# Patient Record
Sex: MALE | Race: ASIAN | NOT HISPANIC OR LATINO | URBAN - METROPOLITAN AREA
[De-identification: names, ages, dates, MRNs, and addresses within clinical notes are randomized per-mention and may not be internally consistent; named-entity substitution may affect disease eponyms.]

---

## 2018-04-07 ENCOUNTER — EMERGENCY (EMERGENCY)
Facility: HOSPITAL | Age: 43
LOS: 1 days | Discharge: HOME | End: 2018-04-07
Attending: EMERGENCY MEDICINE
Payer: MEDICAID

## 2018-04-07 VITALS
TEMPERATURE: 98 F | SYSTOLIC BLOOD PRESSURE: 104 MMHG | HEART RATE: 61 BPM | DIASTOLIC BLOOD PRESSURE: 67 MMHG | OXYGEN SATURATION: 98 % | RESPIRATION RATE: 17 BRPM

## 2018-04-07 LAB
ALBUMIN SERPL ELPH-MCNC: 4.2 G/DL — SIGNIFICANT CHANGE UP (ref 3.3–5)
ALP SERPL-CCNC: 56 U/L — SIGNIFICANT CHANGE UP (ref 40–120)
ALT FLD-CCNC: 46 U/L RC — HIGH (ref 10–45)
ANION GAP SERPL CALC-SCNC: 13 MMOL/L — SIGNIFICANT CHANGE UP (ref 5–17)
AST SERPL-CCNC: 31 U/L — SIGNIFICANT CHANGE UP (ref 10–40)
BASOPHILS # BLD AUTO: 0 K/UL — SIGNIFICANT CHANGE UP (ref 0–0.2)
BASOPHILS NFR BLD AUTO: 0.5 % — SIGNIFICANT CHANGE UP (ref 0–2)
BILIRUB SERPL-MCNC: 0.5 MG/DL — SIGNIFICANT CHANGE UP (ref 0.2–1.2)
BUN SERPL-MCNC: 8 MG/DL — SIGNIFICANT CHANGE UP (ref 7–23)
CALCIUM SERPL-MCNC: 9.6 MG/DL — SIGNIFICANT CHANGE UP (ref 8.4–10.5)
CHLORIDE SERPL-SCNC: 103 MMOL/L — SIGNIFICANT CHANGE UP (ref 96–108)
CO2 SERPL-SCNC: 23 MMOL/L — SIGNIFICANT CHANGE UP (ref 22–31)
CREAT SERPL-MCNC: 1.04 MG/DL — SIGNIFICANT CHANGE UP (ref 0.5–1.3)
EOSINOPHIL # BLD AUTO: 0.1 K/UL — SIGNIFICANT CHANGE UP (ref 0–0.5)
EOSINOPHIL NFR BLD AUTO: 2.5 % — SIGNIFICANT CHANGE UP (ref 0–6)
GLUCOSE SERPL-MCNC: 119 MG/DL — HIGH (ref 70–99)
HCT VFR BLD CALC: 46.5 % — SIGNIFICANT CHANGE UP (ref 39–50)
HGB BLD-MCNC: 15.7 G/DL — SIGNIFICANT CHANGE UP (ref 13–17)
LYMPHOCYTES # BLD AUTO: 1.8 K/UL — SIGNIFICANT CHANGE UP (ref 1–3.3)
LYMPHOCYTES # BLD AUTO: 30.5 % — SIGNIFICANT CHANGE UP (ref 13–44)
MCHC RBC-ENTMCNC: 32 PG — SIGNIFICANT CHANGE UP (ref 27–34)
MCHC RBC-ENTMCNC: 33.9 GM/DL — SIGNIFICANT CHANGE UP (ref 32–36)
MCV RBC AUTO: 94.5 FL — SIGNIFICANT CHANGE UP (ref 80–100)
MONOCYTES # BLD AUTO: 0.6 K/UL — SIGNIFICANT CHANGE UP (ref 0–0.9)
MONOCYTES NFR BLD AUTO: 9.3 % — SIGNIFICANT CHANGE UP (ref 2–14)
NEUTROPHILS # BLD AUTO: 3.4 K/UL — SIGNIFICANT CHANGE UP (ref 1.8–7.4)
NEUTROPHILS NFR BLD AUTO: 57.2 % — SIGNIFICANT CHANGE UP (ref 43–77)
PLATELET # BLD AUTO: 162 K/UL — SIGNIFICANT CHANGE UP (ref 150–400)
POTASSIUM SERPL-MCNC: 3.9 MMOL/L — SIGNIFICANT CHANGE UP (ref 3.5–5.3)
POTASSIUM SERPL-SCNC: 3.9 MMOL/L — SIGNIFICANT CHANGE UP (ref 3.5–5.3)
PROT SERPL-MCNC: 7.5 G/DL — SIGNIFICANT CHANGE UP (ref 6–8.3)
RBC # BLD: 4.92 M/UL — SIGNIFICANT CHANGE UP (ref 4.2–5.8)
RBC # FLD: 12.5 % — SIGNIFICANT CHANGE UP (ref 10.3–14.5)
SODIUM SERPL-SCNC: 139 MMOL/L — SIGNIFICANT CHANGE UP (ref 135–145)
TROPONIN T SERPL-MCNC: <0.01 NG/ML — SIGNIFICANT CHANGE UP (ref 0–0.06)
WBC # BLD: 6 K/UL — SIGNIFICANT CHANGE UP (ref 3.8–10.5)
WBC # FLD AUTO: 6 K/UL — SIGNIFICANT CHANGE UP (ref 3.8–10.5)

## 2018-04-07 PROCEDURE — 93010 ELECTROCARDIOGRAM REPORT: CPT

## 2018-04-07 PROCEDURE — 71046 X-RAY EXAM CHEST 2 VIEWS: CPT | Mod: 26

## 2018-04-07 PROCEDURE — 99285 EMERGENCY DEPT VISIT HI MDM: CPT | Mod: 25

## 2018-04-07 RX ORDER — ASPIRIN/CALCIUM CARB/MAGNESIUM 324 MG
162 TABLET ORAL DAILY
Qty: 0 | Refills: 0 | Status: DISCONTINUED | OUTPATIENT
Start: 2018-04-07 | End: 2018-04-11

## 2018-04-07 RX ADMIN — Medication 162 MILLIGRAM(S): at 22:23

## 2018-04-07 NOTE — ED ADULT NURSE NOTE - OBJECTIVE STATEMENT
43 year old male presented to ED with c/o of palpitations starting today at 3PM. Pt denies CP, denies discomfort spreading anywhere. Pt denies SOB, nausea/vomiting, numbness/tingling, fever, cough, chills, dizziness. Pt has hx of one stent in heart. Pt a&ox3, lung sounds clear, heart rate regular, EKG completed handed to MD, pt placed on cardiac monitor. Abdomen soft nontender nondistended to palp. Skin intact. IV in left AC 20G and patent. Pt currently resting in bed with family at bedside. Side rails up for safety. Will continue to monitor and assess while offering support and reassurance.

## 2018-04-07 NOTE — ED ADULT NURSE NOTE - FALL HARM RISK TYPE OF ASSESSMENT
Pt remains on servoi vent with alarms on and functioning.Pt tolerated respiratory treatment with SHERWIN.   Daily Assessment

## 2018-04-07 NOTE — ED PROVIDER NOTE - NS_ ATTENDINGSCRIBEDETAILS _ED_A_ED_FT
The scribe's documentation has been prepared under my direction and personally reviewed by me in its entirety. I confirm that the note above accurately reflects all work, treatment, procedures, and medical decision making performed by me (Dr. Pretty).

## 2018-04-07 NOTE — ED PROVIDER NOTE - PROGRESS NOTE DETAILS
[employer]:  Pt still with no pain.  Feels comfortable.  Will observe in CDU for serial troponin and stress test in the morning.

## 2018-04-07 NOTE — ED PROVIDER NOTE - OBJECTIVE STATEMENT
44 yo m with pmhx of CAD and stents (placed 12/26/17) presents with right sided chest discomfort, now resolved. Pt states it first started 7 hours ago at 3 pm today when he was sleeping. Describes it as waxing and waning with no radiation. Pt hasn't taken any medication for it. Denies n/v, sob, sweating, abdominal pain, fever. Hasn't never done a stress test before.  PMD: Dr. Tika Thomas  Cardiologist: Dr. Espinoza 44 yo m with pmhx of CAD and stents (placed 12/26/17) presents with right sided chest discomfort, now resolved. Pt states it first started 7 hours ago at 3 pm today when he was sleeping. Describes it as waxing and waning with no radiation. Pt hasn't taken any medication for it. Denies n/v, sob, sweating, abdominal pain, fever. Has never done a stress test before.  PMD: Dr. Tika Thomas  Cardiologist: Dr. Espinoza 42 yo m with pmhx of CAD and stents (placed 12/26/17) presents with right sided chest discomfort, now resolved. Pt states it first started 7 hours ago at 3 pm today when he was sleeping. Describes it as waxing and waning with no radiation. Pt hasn't taken any medication for it. Denies n/v, sob, sweating, abdominal pain, fever. Has never had a stress test before.  PMD: Dr. Tika Thomas  Cardiologist: Dr. Espinoza

## 2018-04-07 NOTE — ED PROVIDER NOTE - MEDICAL DECISION MAKING DETAILS
44 yo m with hx of CAD presenting with episode of chest discomfort earlier today. pt is comfortable now with no pain or discomfort. will check cbc, cmp, troponin, chest x-ray. will reassess and consider observation for provacative testing.

## 2018-04-07 NOTE — ED ADULT NURSE NOTE - CHPI ED SYMPTOMS NEG
no chills/no back pain/no vomiting/no nausea/no chest pain/no cough/no shortness of breath/no syncope/no fever/no dizziness/no diaphoresis

## 2018-04-08 VITALS
DIASTOLIC BLOOD PRESSURE: 66 MMHG | TEMPERATURE: 98 F | SYSTOLIC BLOOD PRESSURE: 108 MMHG | RESPIRATION RATE: 17 BRPM | OXYGEN SATURATION: 99 % | HEART RATE: 71 BPM

## 2018-04-08 LAB
CHOLEST SERPL-MCNC: 91 MG/DL — SIGNIFICANT CHANGE UP (ref 10–199)
HBA1C BLD-MCNC: 5.8 % — HIGH (ref 4–5.6)
HDLC SERPL-MCNC: 37 MG/DL — LOW (ref 40–125)
LIPID PNL WITH DIRECT LDL SERPL: 37 MG/DL — SIGNIFICANT CHANGE UP
TOTAL CHOLESTEROL/HDL RATIO MEASUREMENT: 2.5 RATIO — LOW (ref 3.4–9.6)
TRIGL SERPL-MCNC: 86 MG/DL — SIGNIFICANT CHANGE UP (ref 10–149)
TROPONIN T SERPL-MCNC: <0.01 NG/ML — SIGNIFICANT CHANGE UP (ref 0–0.06)

## 2018-04-08 PROCEDURE — 93005 ELECTROCARDIOGRAM TRACING: CPT

## 2018-04-08 PROCEDURE — 83036 HEMOGLOBIN GLYCOSYLATED A1C: CPT

## 2018-04-08 PROCEDURE — 78452 HT MUSCLE IMAGE SPECT MULT: CPT

## 2018-04-08 PROCEDURE — G0378: CPT

## 2018-04-08 PROCEDURE — A9500: CPT

## 2018-04-08 PROCEDURE — 93016 CV STRESS TEST SUPVJ ONLY: CPT

## 2018-04-08 PROCEDURE — 80053 COMPREHEN METABOLIC PANEL: CPT

## 2018-04-08 PROCEDURE — 99236 HOSP IP/OBS SAME DATE HI 85: CPT

## 2018-04-08 PROCEDURE — 85027 COMPLETE CBC AUTOMATED: CPT

## 2018-04-08 PROCEDURE — 93017 CV STRESS TEST TRACING ONLY: CPT

## 2018-04-08 PROCEDURE — 99284 EMERGENCY DEPT VISIT MOD MDM: CPT | Mod: 25

## 2018-04-08 PROCEDURE — 93018 CV STRESS TEST I&R ONLY: CPT

## 2018-04-08 PROCEDURE — 84484 ASSAY OF TROPONIN QUANT: CPT

## 2018-04-08 PROCEDURE — 80061 LIPID PANEL: CPT

## 2018-04-08 PROCEDURE — 71046 X-RAY EXAM CHEST 2 VIEWS: CPT

## 2018-04-08 PROCEDURE — 78452 HT MUSCLE IMAGE SPECT MULT: CPT | Mod: 26

## 2018-04-08 NOTE — ED CDU PROVIDER INITIAL DAY NOTE - OBJECTIVE STATEMENT
44 yo male w/ PMHx of CAD and stents (placed 12/26/17) presents with right sided chest discomfort, now resolved. Pt states it first started about 7 hours ago at 3 pm today while he was sleeping at home. The discomfort lasted about an hour, was waxing and waning and did not radiate elsewhere, but stayed on the right side of his chest. He denies any association/worsening of the pain with exertion and denies any associated sob, dyspnea on exertion, n/v, weakness, numbness/tingling, dizziness or light-headedness, diaphoresis w/ the discomfort. No recent travel, no abdominal pain, no fever/chills, no recent illness. He states the pain does not feel the same as when he needed stents in december. Has never had a stress test before. PMD: Dr. Tika Thomas Cards: Dr. Leno Espinoza    In ED labwork unremarkable with trop x1 negative, prelim CXR showing clear lungs, EKG w/ NSR although w/ ST changes laterally suggestive of early repolarization. Pt  asymptomatic while in ED. Pt sent to CDU for tele monitoring, repeat CE/EKG, frequent evals, and nuclear stress test in AM. Case discussed w/ ED attending Dr. Pretty. 44 yo male w/ PMHx of CAD and stents (placed 12/26/17), HTN, and HLD presents with right sided chest discomfort, now resolved. Pt states it first started about 7 hours ago at 3 pm today while he was sleeping at home. The discomfort lasted about an hour, was waxing and waning and did not radiate elsewhere, but stayed on the right side of his chest. He denies any association/worsening of the pain with exertion and denies any associated sob, dyspnea on exertion, n/v, weakness, numbness/tingling, dizziness or light-headedness, diaphoresis w/ the discomfort. No recent travel, no abdominal pain, no fever/chills, no recent illness. He states the pain does not feel the same as when he needed stents in december. Has never had a stress test before. PMD: Dr. Tika Thomas Cards: Dr. Leno Espinoza    In ED labwork unremarkable with trop x1 negative, prelim CXR showing clear lungs, EKG w/ NSR although w/ ST changes laterally suggestive of early repolarization. Pt  asymptomatic while in ED. Pt sent to CDU for tele monitoring, repeat CE/EKG, frequent evals, and nuclear stress test in AM. Case discussed w/ ED attending Dr. Pretty.

## 2018-04-08 NOTE — ED CDU PROVIDER INITIAL DAY NOTE - DETAILS
CHEST PAIN  -[facility]  -[facility]  -[facility] EVAL  -STRESS TEST  -CASE D/W ATTENDING Dr. Pretty CHEST PAIN  -TELE  -[facility]  -[facility] EVAL  -NUC STRESS TEST  -CASE D/W ATTENDING Dr. Pretty

## 2018-04-08 NOTE — ED ADULT NURSE REASSESSMENT NOTE - COMFORT CARE
ambulated to bathroom/plan of care explained/darkened lights/po fluids offered/repositioned/warm blanket provided

## 2018-04-08 NOTE — ED CDU PROVIDER INITIAL DAY NOTE - CARDIAC, MLM
Normal rate, regular rhythm.  Heart sounds S1, S2.  No murmurs, rubs or gallops. No lifts/heaves/thrills.

## 2018-04-08 NOTE — ED CDU PROVIDER DISPOSITION NOTE - ATTENDING CONTRIBUTION TO CARE
Patient in CDU for evaluation of chest pains, workup in CDU with negative troponins, nuclear stress testing with scar but no evidence of acute ischemia (likely related to prior CAD), feeling well, will discharge with follow up with his cardiologist.  Paul Canicno M.D.

## 2018-04-08 NOTE — ED CDU PROVIDER INITIAL DAY NOTE - MEDICAL DECISION MAKING DETAILS
42yo male with chest pressure around 3pm yesterday.  no pain since coming to ED.  History of ACS.  ECG non ischemic.  initial troponin negative.  well continue tele monitoring, serial troponin/EKG and nuclear stress test in AM

## 2018-04-08 NOTE — ED CDU PROVIDER DISPOSITION NOTE - PLAN OF CARE
1. Follow up with your PMD in next 1-2 days. Additionally please follow up with your cardiologist Dr. Espinoza in the next 2-3 days for further evaluation/management.  2. Show copies of your reports given to you. Continue all your home medications as previously prescribed, including aspiring 81 mg daily.  3. If you develop any worsening or continued chest pain, shortness of breath, weakness, return to ED immediately.

## 2018-04-08 NOTE — ED CDU PROVIDER INITIAL DAY NOTE - RESPIRATORY, MLM
Breath sounds clear and equal bilaterally. No wheezing, rales, or rhonchi noted. No chest wall tenderness. No crepitus/accessory muscle usage.

## 2018-04-08 NOTE — ED ADULT NURSE REASSESSMENT NOTE - NS ED NURSE REASSESS COMMENT FT1
Received report from previous shift RN. Patient resting comfortably in stretcher in hallway with family at bedside. Patient denies current palpitations, CP or SOB. VSS, on CM. Awaiting plan of care
Report given to CDU RN . Pt aware of bed assignment. Pt currently comfortable. VSS.
Received pt from [facility], received pt alert and responsive, oriented x4, denies any respiratory distress, SOB, or difficulty breathing. Pt transferred to CDU for chest discomfort, pt is currently pain free denies any chest pain, SOB, diaphoresis, dizziness, lightheadedness, N/V, F/C denies heart palpitations. On telemetry hr: 60's SR on monitor. Pending 2nd CE at 0430 and stress test in Am, pt aware not to consume caffeine prior to test, verbalized understanding. IV in place, patent and free of signs of infiltration, V/S stable, pt afebrile, pt denies pain at this time. Pt educated on unit and unit rules, instructed patient to notify RN of any needed assistance, Pt verbalizes understanding, Call bell placed within reach. Safety maintained. Will continue to monitor. Family member at bedside.

## 2018-04-08 NOTE — ED ADULT NURSE REASSESSMENT NOTE - GENERAL PATIENT STATE
smiling/interactive/family/SO at bedside/comfortable appearance/cooperative/resting/sleeping/improvement verbalized

## 2018-04-08 NOTE — ED CDU PROVIDER DISPOSITION NOTE - CLINICAL COURSE
45 y/o M pmhx of CAD w/ stents in 12/17 presented with right sided chest discomfort that woke him up from sleep this afternoon, waxing/waning, with no association w/ exertion or other symptoms. After initial unremarkable ED workup, patient was sent to CDU for continuous telemetry monitoring, repeat cardiac enzymes and EKGs and a nuclear exercise stress test. Patient did not have any acute events on telemetry overnight while in the CDU. Repeat troponin was negative and repeat EKG showed________________. Stress test in AM showed ___________________. 45 y/o M pmhx of CAD w/ stents in 12/17 presented with right sided chest discomfort that woke him up from sleep this afternoon, waxing/waning, with no association w/ exertion or other symptoms. After initial unremarkable ED workup, patient was sent to CDU for continuous telemetry monitoring, repeat cardiac enzymes and EKGs and a nuclear exercise stress test. Patient did not have any acute events on telemetry overnight while in the CDU. Repeat troponin was negative and repeat EKG showed unchanged early repolarization laterally but otherwise NSR. Stress test in AM showed ___________________. 43 y/o M pmhx of CAD w/ stents in 12/17 presented with right sided chest discomfort that woke him up from sleep this afternoon, waxing/waning, with no association w/ exertion or other symptoms. After initial unremarkable ED workup, patient was sent to CDU for continuous telemetry monitoring, repeat cardiac enzymes and EKGs and a nuclear exercise stress test. Patient did not have any acute events on telemetry overnight while in the CDU. Repeat troponin was negative and repeat EKG showed unchanged early repolarization laterally but otherwise NSR. Stress test in AM showed evidence of scarring without any evidence of ischemia consistent with previous event and stent placement in 12/2017. Patient remained chest pain free throughout time in CDU and was discharged home with instructions to continue medications as directed, follow-up with cardiologist and return precautions.

## 2018-04-08 NOTE — ED CDU PROVIDER INITIAL DAY NOTE - PROGRESS NOTE DETAILS
Patient sleeping. NAD. No complaints. VSS. No events on tele. Will continue to monitor and reassess. second troponin/ekg pending. - Derek Weinstein PA-C CDU PROGRESS NOTE DIXIE DOE: Pt resting comfortably, feeling well without complaint. NAD, VSS. No events on telemetry. Pending stress test at this time. CDU PROGRESS NOTE DIXIE DOE: Pt resting comfortably, feeling well without complaint. NAD, VSS. No events on telemetry. Pending stress test results at this time. REsults of stress test show evidence of scarring with no evidence of ischemia. Scarring likely from previous event when stent was placed in December 2017, patient has been chest pain free throughout time in ED and CDU. Will discharge home with instructions to continue medications as directed, follow-up with cardiologist and return precautions. Discussed with Dr. Cancino. Jr Holm PA-C
